# Patient Record
Sex: MALE | Race: WHITE | NOT HISPANIC OR LATINO | Employment: OTHER | URBAN - METROPOLITAN AREA
[De-identification: names, ages, dates, MRNs, and addresses within clinical notes are randomized per-mention and may not be internally consistent; named-entity substitution may affect disease eponyms.]

---

## 2018-06-16 ENCOUNTER — HOSPITAL ENCOUNTER (EMERGENCY)
Facility: HOSPITAL | Age: 63
Discharge: HOME | End: 2018-06-16
Attending: EMERGENCY MEDICINE

## 2018-06-16 VITALS
BODY MASS INDEX: 30.44 KG/M2 | RESPIRATION RATE: 16 BRPM | OXYGEN SATURATION: 97 % | TEMPERATURE: 98 F | WEIGHT: 250 LBS | HEART RATE: 83 BPM | HEIGHT: 76 IN | SYSTOLIC BLOOD PRESSURE: 127 MMHG | DIASTOLIC BLOOD PRESSURE: 77 MMHG

## 2018-06-16 DIAGNOSIS — R05.9 COUGH: ICD-10-CM

## 2018-06-16 DIAGNOSIS — R21 FACIAL RASH: Primary | ICD-10-CM

## 2018-06-16 PROCEDURE — 25000003 PHARM REV CODE 250: Performed by: PHYSICIAN ASSISTANT

## 2018-06-16 PROCEDURE — 63600175 PHARM REV CODE 636 W HCPCS: Performed by: PHYSICIAN ASSISTANT

## 2018-06-16 PROCEDURE — 99284 EMERGENCY DEPT VISIT MOD MDM: CPT

## 2018-06-16 RX ORDER — CETIRIZINE HYDROCHLORIDE 10 MG/1
10 TABLET ORAL DAILY
Qty: 30 TABLET | Refills: 0 | Status: SHIPPED | OUTPATIENT
Start: 2018-06-16 | End: 2019-06-16

## 2018-06-16 RX ORDER — ALBUTEROL SULFATE 90 UG/1
1-2 AEROSOL, METERED RESPIRATORY (INHALATION) EVERY 6 HOURS PRN
Qty: 1 INHALER | Refills: 0 | Status: SHIPPED | OUTPATIENT
Start: 2018-06-16 | End: 2019-06-16

## 2018-06-16 RX ORDER — VALACYCLOVIR HYDROCHLORIDE 1 G/1
1000 TABLET, FILM COATED ORAL 3 TIMES DAILY
Qty: 21 TABLET | Refills: 0 | Status: SHIPPED | OUTPATIENT
Start: 2018-06-16 | End: 2018-06-23

## 2018-06-16 RX ORDER — TETRACAINE HYDROCHLORIDE 5 MG/ML
2 SOLUTION OPHTHALMIC
Status: COMPLETED | OUTPATIENT
Start: 2018-06-16 | End: 2018-06-16

## 2018-06-16 RX ORDER — VALACYCLOVIR HYDROCHLORIDE 500 MG/1
1000 TABLET, FILM COATED ORAL ONCE
Status: COMPLETED | OUTPATIENT
Start: 2018-06-16 | End: 2018-06-16

## 2018-06-16 RX ORDER — FLUTICASONE PROPIONATE 50 MCG
1 SPRAY, SUSPENSION (ML) NASAL 2 TIMES DAILY PRN
Qty: 15 G | Refills: 0 | Status: SHIPPED | OUTPATIENT
Start: 2018-06-16

## 2018-06-16 RX ADMIN — VALACYCLOVIR HYDROCHLORIDE 1000 MG: 500 TABLET, FILM COATED ORAL at 10:06

## 2018-06-16 RX ADMIN — TETRACAINE HYDROCHLORIDE 2 DROP: 5 SOLUTION OPHTHALMIC at 10:06

## 2018-06-16 RX ADMIN — FLUORESCEIN SODIUM AND BENOXINATE HYDROCHLORIDE 1 DROP: 4; 2.5 SOLUTION OPHTHALMIC at 10:06

## 2018-06-16 NOTE — DISCHARGE INSTRUCTIONS
Continue current care. Albuterol inhaler as needed for shortness of breath or wheeze. Zyrtec daily. Flonase twice daily. Valtrex as prescribed. Follow-up with a primary care provider next week for reevaluation. Return to this ED if rash worsens, if fever persists despite treatment, if any other problems occur.

## 2018-06-16 NOTE — ED PROVIDER NOTES
Encounter Date: 6/16/2018     This is a 62 y.o. male complaining of painful rash to right forehead. Reports associated subjective high fevers for the past two days. Took ibuprofen at 03:00 this morning.    I have evaluated and conducted a medical screening exam with initial orders entered, if indicated, to expedite care. The patient will be placed in a treatment area when one is available. Care will be transferred to an alternate provider for a full assessment including but not limited to: history, physical exam, additional orders, and final disposition.    José Manuel Fernández NP         History   No chief complaint on file.    63yo M with no significant pmh on file presents to ED with chief complaint rash to L frontal scalp x 5 days. Pt states he recently began with productive cough, odynophagia, myalgias over the past 2 weeks. He states he began with fever on Monday, rash on Tuesday. Describes pain as sharp, sometimes burning/tingling sensation. Pain constant, keeps him up at night. No alleviating factors. No radiation of pain. No SOB or CP. NO hemoptysis. No night sweats or unintentional weight loss.           Review of patient's allergies indicates:   Allergen Reactions    Penicillins Anaphylaxis     No past medical history on file.  Past Surgical History:   Procedure Laterality Date    gall bladder remove  1991     No family history on file.  Social History   Substance Use Topics    Smoking status: Never Smoker    Smokeless tobacco: Never Used    Alcohol use Yes      Comment: occacionally     Review of Systems   Constitutional: Positive for chills and fever.   HENT: Negative for sore throat.    Respiratory: Positive for cough. Negative for shortness of breath.    Cardiovascular: Negative for chest pain.   Gastrointestinal: Negative for nausea.   Genitourinary: Negative for dysuria.   Musculoskeletal: Positive for myalgias. Negative for back pain, neck pain and neck stiffness.   Skin: Positive for rash.    Neurological: Negative for weakness.   Hematological: Does not bruise/bleed easily.   All other systems reviewed and are negative.      Physical Exam     Initial Vitals   BP Pulse Resp Temp SpO2   -- -- -- -- --      MAP       --         Physical Exam    Nursing note and vitals reviewed.  Constitutional: He appears well-developed and well-nourished. He is not diaphoretic. No distress.   HENT:   Head: Normocephalic and atraumatic.       Eyes: Conjunctivae and EOM are normal. Pupils are equal, round, and reactive to light.   No ganglion lesion to L eye on fluorescein exam, no lid droop, no proptosis, no periorbital erythema or swelling   Neck: Normal range of motion. Neck supple.   Cardiovascular: Intact distal pulses.   Pulmonary/Chest: Breath sounds normal. No respiratory distress. He has no wheezes. He has no rhonchi. He exhibits no tenderness.   Abdominal: Soft. Bowel sounds are normal. He exhibits no distension. There is no tenderness.   Musculoskeletal: Normal range of motion. He exhibits no tenderness.   Neurological: He is alert and oriented to person, place, and time. He has normal strength.   Skin: Skin is warm and dry. Capillary refill takes less than 2 seconds. No rash and no abscess noted. No erythema.   Psychiatric: He has a normal mood and affect. His behavior is normal. Judgment and thought content normal.         ED Course   Procedures  Labs Reviewed - No data to display       No orders to display        Medical Decision Making:   Differential Diagnosis:   Herpes zoster, cellulitis, dermatitis  ED Management:  62-year-old male chief complaint rash ×5 days, intermittent fever over the past 6 days, recent illness over the past 2 weeks.  Is not immunocompromised.  Overall well-appearing and nontoxic.  Rash does appear blistered. He is well appearing, nontoxic.  Vitals reassuring.  Lungs clear bilaterally.  Chest x-ray negative for consolidation, effusion, or pneumothorax.  He denies shortness of breath  or chest pain.  I do think patient has been dealing with an upper respiratory illness over the past 2-3 weeks, which made him susceptible to Varicella.  Given the proximity to eye, fluorescein exam performed which was negative for any ganglionic or stellate lesion.  I do think this represent herpes zoster, will treat accordingly.  The patient follow-up with primary care physician early next week for reevaluation.  Return precautions given.   Other:   I have discussed this case with another health care provider.       <> Summary of the Discussion: Dr. Garcia                      Clinical Impression:   The primary encounter diagnosis was Facial rash. A diagnosis of Cough was also pertinent to this visit.      Disposition:   Disposition: Discharged  Condition: Stable                        Carlo Bailey PA-C  06/16/18 [id number]

## 2018-06-16 NOTE — ED TRIAGE NOTES
Fever began Monday with chills. Painful tingling red rash erupted yesterday, but first began with pain Wednesday. Motrin last taken at 0300 this am. Unknown highest temp

## 2019-10-14 ENCOUNTER — ANESTHESIA (OUTPATIENT)
Dept: SURGERY | Facility: HOSPITAL | Age: 64
End: 2019-10-14

## 2019-10-14 ENCOUNTER — HOSPITAL ENCOUNTER (EMERGENCY)
Facility: HOSPITAL | Age: 64
Discharge: ANOTHER HOSPITAL | End: 2019-10-14
Attending: EMERGENCY MEDICINE

## 2019-10-14 ENCOUNTER — ANESTHESIA EVENT (OUTPATIENT)
Dept: SURGERY | Facility: HOSPITAL | Age: 64
End: 2019-10-14

## 2019-10-14 VITALS
OXYGEN SATURATION: 100 % | SYSTOLIC BLOOD PRESSURE: 151 MMHG | BODY MASS INDEX: 29.83 KG/M2 | WEIGHT: 245 LBS | TEMPERATURE: 100 F | RESPIRATION RATE: 26 BRPM | HEART RATE: 98 BPM | HEIGHT: 76 IN | DIASTOLIC BLOOD PRESSURE: 72 MMHG

## 2019-10-14 DIAGNOSIS — R05.9 COUGH: ICD-10-CM

## 2019-10-14 DIAGNOSIS — Z46.59 ENCOUNTER FOR NASOGASTRIC (NG) TUBE PLACEMENT: ICD-10-CM

## 2019-10-14 DIAGNOSIS — K12.2 LUDWIG'S ANGINA: Primary | ICD-10-CM

## 2019-10-14 LAB
ALBUMIN SERPL BCP-MCNC: 3.9 G/DL (ref 3.5–5.2)
ALP SERPL-CCNC: 104 U/L (ref 55–135)
ALT SERPL W/O P-5'-P-CCNC: 92 U/L (ref 10–44)
ANION GAP SERPL CALC-SCNC: 12 MMOL/L (ref 8–16)
ANION GAP SERPL CALC-SCNC: 17 MMOL/L (ref 8–16)
AST SERPL-CCNC: 61 U/L (ref 10–40)
BASOPHILS # BLD AUTO: 0.06 K/UL (ref 0–0.2)
BASOPHILS NFR BLD: 0.4 % (ref 0–1.9)
BILIRUB SERPL-MCNC: 2.4 MG/DL (ref 0.1–1)
BUN SERPL-MCNC: 22 MG/DL (ref 8–23)
BUN SERPL-MCNC: 24 MG/DL (ref 6–30)
CALCIUM SERPL-MCNC: 9.8 MG/DL (ref 8.7–10.5)
CHLORIDE SERPL-SCNC: 100 MMOL/L (ref 95–110)
CHLORIDE SERPL-SCNC: 98 MMOL/L (ref 95–110)
CO2 SERPL-SCNC: 28 MMOL/L (ref 23–29)
CREAT SERPL-MCNC: 1.5 MG/DL (ref 0.5–1.4)
CREAT SERPL-MCNC: 1.5 MG/DL (ref 0.5–1.4)
CRP SERPL-MCNC: 247.8 MG/L (ref 0–8.2)
DIFFERENTIAL METHOD: ABNORMAL
EOSINOPHIL # BLD AUTO: 0.2 K/UL (ref 0–0.5)
EOSINOPHIL NFR BLD: 1 % (ref 0–8)
ERYTHROCYTE [DISTWIDTH] IN BLOOD BY AUTOMATED COUNT: 13.2 % (ref 11.5–14.5)
ERYTHROCYTE [SEDIMENTATION RATE] IN BLOOD BY WESTERGREN METHOD: 24 MM/HR (ref 0–10)
EST. GFR  (AFRICAN AMERICAN): 56 ML/MIN/1.73 M^2
EST. GFR  (NON AFRICAN AMERICAN): 49 ML/MIN/1.73 M^2
GLUCOSE SERPL-MCNC: 186 MG/DL (ref 70–110)
GLUCOSE SERPL-MCNC: 196 MG/DL (ref 70–110)
HCT VFR BLD AUTO: 49.5 % (ref 40–54)
HCT VFR BLD CALC: 49 %PCV (ref 36–54)
HGB BLD-MCNC: 16.3 G/DL (ref 14–18)
IMM GRANULOCYTES # BLD AUTO: 0.08 K/UL (ref 0–0.04)
IMM GRANULOCYTES NFR BLD AUTO: 0.6 % (ref 0–0.5)
LACTATE SERPL-SCNC: 2.9 MMOL/L (ref 0.5–2.2)
LYMPHOCYTES # BLD AUTO: 0.5 K/UL (ref 1–4.8)
LYMPHOCYTES NFR BLD: 3.6 % (ref 18–48)
MCH RBC QN AUTO: 31.8 PG (ref 27–31)
MCHC RBC AUTO-ENTMCNC: 32.9 G/DL (ref 32–36)
MCV RBC AUTO: 97 FL (ref 82–98)
MONOCYTES # BLD AUTO: 0.9 K/UL (ref 0.3–1)
MONOCYTES NFR BLD: 6.1 % (ref 4–15)
NEUTROPHILS # BLD AUTO: 12.8 K/UL (ref 1.8–7.7)
NEUTROPHILS NFR BLD: 88.3 % (ref 38–73)
NRBC BLD-RTO: 0 /100 WBC
PLATELET # BLD AUTO: 199 K/UL (ref 150–350)
PMV BLD AUTO: 10.5 FL (ref 9.2–12.9)
POC IONIZED CALCIUM: 1.18 MMOL/L (ref 1.06–1.42)
POC TCO2 (MEASURED): 28 MMOL/L (ref 23–29)
POTASSIUM BLD-SCNC: 4.5 MMOL/L (ref 3.5–5.1)
POTASSIUM SERPL-SCNC: 4.8 MMOL/L (ref 3.5–5.1)
PROT SERPL-MCNC: 7.8 G/DL (ref 6–8.4)
RBC # BLD AUTO: 5.13 M/UL (ref 4.6–6.2)
SAMPLE: ABNORMAL
SODIUM BLD-SCNC: 136 MMOL/L (ref 136–145)
SODIUM SERPL-SCNC: 140 MMOL/L (ref 136–145)
WBC # BLD AUTO: 14.48 K/UL (ref 3.9–12.7)

## 2019-10-14 PROCEDURE — 25500020 PHARM REV CODE 255: Performed by: EMERGENCY MEDICINE

## 2019-10-14 PROCEDURE — 63600175 PHARM REV CODE 636 W HCPCS: Performed by: NURSE PRACTITIONER

## 2019-10-14 PROCEDURE — 99900026 HC AIRWAY MAINTENANCE (STAT)

## 2019-10-14 PROCEDURE — 84132 ASSAY OF SERUM POTASSIUM: CPT

## 2019-10-14 PROCEDURE — 85652 RBC SED RATE AUTOMATED: CPT

## 2019-10-14 PROCEDURE — 37000008 HC ANESTHESIA 1ST 15 MINUTES: Performed by: SURGERY

## 2019-10-14 PROCEDURE — 99291 CRITICAL CARE FIRST HOUR: CPT | Mod: 25

## 2019-10-14 PROCEDURE — 80053 COMPREHEN METABOLIC PANEL: CPT

## 2019-10-14 PROCEDURE — 36000705 HC OR TIME LEV I EA ADD 15 MIN: Performed by: SURGERY

## 2019-10-14 PROCEDURE — S0077 INJECTION, CLINDAMYCIN PHOSP: HCPCS | Performed by: NURSE PRACTITIONER

## 2019-10-14 PROCEDURE — 25000003 PHARM REV CODE 250: Performed by: NURSE PRACTITIONER

## 2019-10-14 PROCEDURE — 96366 THER/PROPH/DIAG IV INF ADDON: CPT

## 2019-10-14 PROCEDURE — 96367 TX/PROPH/DG ADDL SEQ IV INF: CPT

## 2019-10-14 PROCEDURE — 96365 THER/PROPH/DIAG IV INF INIT: CPT

## 2019-10-14 PROCEDURE — 27000221 HC OXYGEN, UP TO 24 HOURS

## 2019-10-14 PROCEDURE — 94002 VENT MGMT INPAT INIT DAY: CPT

## 2019-10-14 PROCEDURE — 25000003 PHARM REV CODE 250: Performed by: ANESTHESIOLOGY

## 2019-10-14 PROCEDURE — 96375 TX/PRO/DX INJ NEW DRUG ADDON: CPT | Mod: 59

## 2019-10-14 PROCEDURE — 36000704 HC OR TIME LEV I 1ST 15 MIN: Performed by: SURGERY

## 2019-10-14 PROCEDURE — 85014 HEMATOCRIT: CPT

## 2019-10-14 PROCEDURE — 37000009 HC ANESTHESIA EA ADD 15 MINS: Performed by: SURGERY

## 2019-10-14 PROCEDURE — 63600175 PHARM REV CODE 636 W HCPCS: Performed by: ANESTHESIOLOGY

## 2019-10-14 PROCEDURE — 63600175 PHARM REV CODE 636 W HCPCS: Performed by: EMERGENCY MEDICINE

## 2019-10-14 PROCEDURE — 99900035 HC TECH TIME PER 15 MIN (STAT)

## 2019-10-14 PROCEDURE — 84295 ASSAY OF SERUM SODIUM: CPT

## 2019-10-14 PROCEDURE — 94761 N-INVAS EAR/PLS OXIMETRY MLT: CPT

## 2019-10-14 PROCEDURE — 83605 ASSAY OF LACTIC ACID: CPT

## 2019-10-14 PROCEDURE — 82565 ASSAY OF CREATININE: CPT

## 2019-10-14 PROCEDURE — 85025 COMPLETE CBC W/AUTO DIFF WBC: CPT

## 2019-10-14 PROCEDURE — 99292 CRITICAL CARE ADDL 30 MIN: CPT

## 2019-10-14 PROCEDURE — 86140 C-REACTIVE PROTEIN: CPT

## 2019-10-14 PROCEDURE — 82330 ASSAY OF CALCIUM: CPT

## 2019-10-14 PROCEDURE — 25000003 PHARM REV CODE 250: Performed by: EMERGENCY MEDICINE

## 2019-10-14 PROCEDURE — 63600175 PHARM REV CODE 636 W HCPCS

## 2019-10-14 PROCEDURE — 87040 BLOOD CULTURE FOR BACTERIA: CPT | Mod: 59

## 2019-10-14 RX ORDER — CLINDAMYCIN PHOSPHATE 600 MG/50ML
600 INJECTION, SOLUTION INTRAVENOUS
Status: COMPLETED | OUTPATIENT
Start: 2019-10-14 | End: 2019-10-14

## 2019-10-14 RX ORDER — MIDAZOLAM HYDROCHLORIDE 1 MG/ML
4 INJECTION INTRAMUSCULAR; INTRAVENOUS
Status: COMPLETED | OUTPATIENT
Start: 2019-10-14 | End: 2019-10-14

## 2019-10-14 RX ORDER — LABETALOL HYDROCHLORIDE 5 MG/ML
INJECTION, SOLUTION INTRAVENOUS
Status: DISCONTINUED | OUTPATIENT
Start: 2019-10-14 | End: 2019-10-15

## 2019-10-14 RX ORDER — FENTANYL CITRATE 50 UG/ML
INJECTION, SOLUTION INTRAMUSCULAR; INTRAVENOUS
Status: DISCONTINUED | OUTPATIENT
Start: 2019-10-14 | End: 2019-10-15

## 2019-10-14 RX ORDER — ROCURONIUM BROMIDE 10 MG/ML
INJECTION, SOLUTION INTRAVENOUS
Status: DISCONTINUED | OUTPATIENT
Start: 2019-10-14 | End: 2019-10-15

## 2019-10-14 RX ORDER — PROPOFOL 10 MG/ML
INJECTION, EMULSION INTRAVENOUS
Status: COMPLETED
Start: 2019-10-14 | End: 2019-10-14

## 2019-10-14 RX ORDER — VECURONIUM BROMIDE FOR INJECTION 1 MG/ML
10 INJECTION, POWDER, LYOPHILIZED, FOR SOLUTION INTRAVENOUS ONCE
Status: COMPLETED | OUTPATIENT
Start: 2019-10-14 | End: 2019-10-14

## 2019-10-14 RX ORDER — LEVOFLOXACIN 5 MG/ML
750 INJECTION, SOLUTION INTRAVENOUS
Status: COMPLETED | OUTPATIENT
Start: 2019-10-14 | End: 2019-10-14

## 2019-10-14 RX ORDER — PROPOFOL 10 MG/ML
VIAL (ML) INTRAVENOUS
Status: DISCONTINUED | OUTPATIENT
Start: 2019-10-14 | End: 2019-10-15

## 2019-10-14 RX ORDER — PROPOFOL 10 MG/ML
20 INJECTION, EMULSION INTRAVENOUS
Status: COMPLETED | OUTPATIENT
Start: 2019-10-14 | End: 2019-10-14

## 2019-10-14 RX ORDER — KETAMINE HYDROCHLORIDE 50 MG/ML
INJECTION, SOLUTION INTRAMUSCULAR; INTRAVENOUS
Status: DISCONTINUED | OUTPATIENT
Start: 2019-10-14 | End: 2019-10-15

## 2019-10-14 RX ORDER — PROPOFOL 10 MG/ML
50 INJECTION, EMULSION INTRAVENOUS
Status: COMPLETED | OUTPATIENT
Start: 2019-10-14 | End: 2019-10-14

## 2019-10-14 RX ORDER — MIDAZOLAM HYDROCHLORIDE 1 MG/ML
INJECTION, SOLUTION INTRAMUSCULAR; INTRAVENOUS
Status: DISCONTINUED | OUTPATIENT
Start: 2019-10-14 | End: 2019-10-15

## 2019-10-14 RX ADMIN — PROPOFOL 50 MCG/KG/MIN: 10 INJECTION, EMULSION INTRAVENOUS at 04:10

## 2019-10-14 RX ADMIN — VECURONIUM BROMIDE 10 MG: 1 INJECTION, POWDER, LYOPHILIZED, FOR SOLUTION INTRAVENOUS at 06:10

## 2019-10-14 RX ADMIN — LEVOFLOXACIN 750 MG: 750 INJECTION, SOLUTION INTRAVENOUS at 01:10

## 2019-10-14 RX ADMIN — MIDAZOLAM HYDROCHLORIDE 1 MG: 1 INJECTION, SOLUTION INTRAMUSCULAR; INTRAVENOUS at 03:10

## 2019-10-14 RX ADMIN — MIDAZOLAM HYDROCHLORIDE 2 MG: 1 INJECTION, SOLUTION INTRAMUSCULAR; INTRAVENOUS at 03:10

## 2019-10-14 RX ADMIN — BENZOCAINE, BUTAMBEN, AND TETRACAINE HYDROCHLORIDE 1 SPRAY: .028; .004; .004 AEROSOL, SPRAY TOPICAL at 03:10

## 2019-10-14 RX ADMIN — FENTANYL CITRATE 100 MCG: 50 INJECTION INTRAMUSCULAR; INTRAVENOUS at 03:10

## 2019-10-14 RX ADMIN — PROPOFOL 30 MG: 10 INJECTION, EMULSION INTRAVENOUS at 03:10

## 2019-10-14 RX ADMIN — SODIUM CHLORIDE 1000 ML: 0.9 INJECTION, SOLUTION INTRAVENOUS at 12:10

## 2019-10-14 RX ADMIN — KETAMINE HYDROCHLORIDE 10 MG: 50 INJECTION, SOLUTION, CONCENTRATE INTRAMUSCULAR; INTRAVENOUS at 03:10

## 2019-10-14 RX ADMIN — MIDAZOLAM HYDROCHLORIDE 4 MG: 1 INJECTION, SOLUTION INTRAMUSCULAR; INTRAVENOUS at 05:10

## 2019-10-14 RX ADMIN — PROPOFOL 20 MG: 10 INJECTION, EMULSION INTRAVENOUS at 03:10

## 2019-10-14 RX ADMIN — CLINDAMYCIN IN 5 PERCENT DEXTROSE 600 MG: 12 INJECTION, SOLUTION INTRAVENOUS at 12:10

## 2019-10-14 RX ADMIN — LABETALOL HYDROCHLORIDE 10 MG: 5 INJECTION, SOLUTION INTRAVENOUS at 03:10

## 2019-10-14 RX ADMIN — KETAMINE HYDROCHLORIDE 30 MG: 50 INJECTION, SOLUTION, CONCENTRATE INTRAMUSCULAR; INTRAVENOUS at 03:10

## 2019-10-14 RX ADMIN — IOHEXOL 100 ML: 350 INJECTION, SOLUTION INTRAVENOUS at 01:10

## 2019-10-14 RX ADMIN — KETAMINE HYDROCHLORIDE 20 MG: 50 INJECTION, SOLUTION, CONCENTRATE INTRAMUSCULAR; INTRAVENOUS at 03:10

## 2019-10-14 RX ADMIN — ROCURONIUM BROMIDE 50 MG: 10 INJECTION, SOLUTION INTRAVENOUS at 03:10

## 2019-10-14 RX ADMIN — PROPOFOL 70 MCG/KG/MIN: 10 INJECTION, EMULSION INTRAVENOUS at 05:10

## 2019-10-14 RX ADMIN — KETAMINE HYDROCHLORIDE 40 MG: 50 INJECTION, SOLUTION, CONCENTRATE INTRAMUSCULAR; INTRAVENOUS at 03:10

## 2019-10-14 NOTE — ED NOTES
Per Dr. Adams keep propofol parameter orders the same as previous order. Titrate every 5mins by 10mcg/kg/min for a max of 200mcg/kg/min.

## 2019-10-14 NOTE — CARE UPDATE
Pt left by EMS to [facility]. Pt remained intubated and was placed on EMS transport ventilator without any problems.

## 2019-10-14 NOTE — ED PROVIDER NOTES
Encounter Date: 10/14/2019    SCRIBE #1 NOTE: I, Jasmin Banks, am scribing for, and in the presence of,  Maryann Walton NP. I have scribed the following portions of the note - Other sections scribed: HPI, ROS, PE.       History     Chief Complaint   Patient presents with    Sore Throat     pt c/o sore throat x 3 days.     This is a 64 y.o. Male with a who presents to the ED complaining of acute, severe, dental issue since 2 days ago. He reports being recommended by urgent care this morning to go to the ER for IV antibiotics. Patient reports associated subjective fever, appetite change, and congestion.  He reports that the pain initially started in the right lower back dental area but now the pain is in his mouth and throat. He adds difficulty swallowing however he is tolerating his secretions at this time. He states no other associated symptoms. He adds nothing worsens symptoms. He notes nothing alleviates symptoms.         The history is provided by the patient. No  was used.     Review of patient's allergies indicates:   Allergen Reactions    Penicillins Anaphylaxis     History reviewed. No pertinent past medical history.  Past Surgical History:   Procedure Laterality Date    CHOLECYSTECTOMY      gall bladder remove  1991     History reviewed. No pertinent family history.  Social History     Tobacco Use    Smoking status: Never Smoker    Smokeless tobacco: Never Used   Substance Use Topics    Alcohol use: Yes     Comment: occacionally    Drug use: No     Review of Systems   Constitutional: Positive for appetite change and fever (Subjective). Negative for chills.   HENT: Positive for congestion and dental problem. Negative for sore throat.    Eyes: Negative for visual disturbance.   Respiratory: Negative for cough and shortness of breath.    Cardiovascular: Negative for chest pain.   Gastrointestinal: Negative for abdominal pain, nausea and vomiting.   Endocrine: Negative for  polyuria.   Musculoskeletal: Negative for back pain and neck pain.   Skin: Negative for rash.   Neurological: Negative for dizziness and headaches.   All other systems reviewed and are negative.      Physical Exam     Initial Vitals [10/14/19 1037]   BP Pulse Resp Temp SpO2   121/69 99 18 99.2 °F (37.3 °C) 96 %      MAP       --         Physical Exam    Nursing note reviewed.  Constitutional: He appears well-developed and well-nourished. He appears distressed.   Appears uncomfortable.  Mild distress.     HENT:   Head: Atraumatic.   Mouth/Throat: There is trismus in the jaw.   Floor of mouth firm.  Positive trismus.  Moderate edema to floor mouth with positive firmness and tenderness.   Eyes: Conjunctivae and EOM are normal. Pupils are equal, round, and reactive to light.   Neck: Normal range of motion. Edema (Moderate) present.   Moderate tenderness throughout anterior neck.    Mild stridor   Cardiovascular: Normal rate, regular rhythm, normal heart sounds and intact distal pulses.   Pulmonary/Chest: Breath sounds normal. Stridor present. No respiratory distress. He has no wheezes. He has no rhonchi. He has no rales.   Abdominal: Soft. Bowel sounds are normal. He exhibits no distension. There is no tenderness.   Musculoskeletal: Normal range of motion. He exhibits no edema.   Neurological: He is alert and oriented to person, place, and time. He has normal strength. No cranial nerve deficit. GCS score is 15. GCS eye subscore is 4. GCS verbal subscore is 5. GCS motor subscore is 6.   Skin: Skin is warm and dry. Capillary refill takes less than 2 seconds. No rash noted.         ED Course   Procedures  Labs Reviewed   CBC W/ AUTO DIFFERENTIAL - Abnormal; Notable for the following components:       Result Value    WBC 14.48 (*)     Mean Corpuscular Hemoglobin 31.8 (*)     Immature Granulocytes 0.6 (*)     Gran # (ANC) 12.8 (*)     Immature Grans (Abs) 0.08 (*)     Lymph # 0.5 (*)     Gran% 88.3 (*)     Lymph% 3.6 (*)      All other components within normal limits   COMPREHENSIVE METABOLIC PANEL - Abnormal; Notable for the following components:    Glucose 186 (*)     Creatinine 1.5 (*)     Total Bilirubin 2.4 (*)     AST 61 (*)     ALT 92 (*)     eGFR if  56 (*)     eGFR if non  49 (*)     All other components within normal limits   C-REACTIVE PROTEIN - Abnormal; Notable for the following components:    .8 (*)     All other components within normal limits   SEDIMENTATION RATE - Abnormal; Notable for the following components:    Sed Rate 24 (*)     All other components within normal limits   LACTIC ACID, PLASMA - Abnormal; Notable for the following components:    Lactate (Lactic Acid) 2.9 (*)     All other components within normal limits   ISTAT PROCEDURE - Abnormal; Notable for the following components:    POC Glucose 196 (*)     POC Creatinine 1.5 (*)     POC Anion Gap 17 (*)     All other components within normal limits   CULTURE, BLOOD   CULTURE, BLOOD   ISTAT CHEM8          Imaging Results           CT Neck Chest With Contrast (XPD) (Final result)  Result time 10/14/19 14:38:47    Final result by Hima Wallace MD (10/14/19 14:38:47)                 Impression:      1. Findings suggestive of an abscess that measures at least approximately 4.5 x 1.9 cm deep to the right mylohyoid muscle, in the expected location of the sublingual glands.  In the differential diagnosis an abscess in the region of the sublingual gland or Laureano's angina may be considered.  There is a cavity seen in the right 2nd molar tooth however no definite erosive change of the bony walls of the right mandible noted.  2. Enlarged right submandibular gland when compared to the contralateral side likely related to right submandibular sialoadenitis.  3. Cervical nodes likely inflammatory nodes.  4. Minimal scar-like changes are seen in the right lung base.  Chest CT otherwise is grossly unremarkable.  This report was flagged in  Epic as abnormal.      Electronically signed by: Hima Wallace MD  Date:    10/14/2019  Time:    14:38             Narrative:    EXAMINATION:  CT NECK CHEST WITH CONTRAST (XPD)    CLINICAL HISTORY:  abscess;    TECHNIQUE:  Low dose axial images, coronal and sagittal reformations were obtained from the skull base to the lung bases following the intravenous administration of 75 mL of Omnipaque 350.    COMPARISON:  None.    FINDINGS:  Deep to the mylohyoid muscle and medial to the body of the right mandible there is a large abscess with multiple pockets of air like density in the floor of the tongue.  The bony walls of the body of the mandible however are intact.  There is a carious tooth with a cavity involving the 2nd molar tooth.  However I do not clearly visualize any definite abscess in the region of the root of the carious tooth.  Rest of the teeth appear to be normal.  The abscess appears to be in the region of the sublingual glands.  There is infiltration of the fat.    Slight enlargement of the right submandibular gland.  I cannot exclude solid nidus involving the right submandibular gland.  Along the expected course of the Gay's duct no abnormal calcifications noted.    Slight thickening of the platysma overlying the right neck.  In addition there are also multiple submental as well as submandibular enlarged nodes measuring approximately 1-1.5 cm in maximum diameter and likely representing inflammatory/infectious nodes.  The abscess appears to be contained deep to the mylohyoid muscle.    The nasopharynx and the parapharyngeal soft tissues and the parotid glands and the  muscles are unremarkable.  The paranasal air sinuses are clear.  Skull base is unremarkable.    No supraglottic masses are noted.  The vocal cords are symmetric and normal.  The visualized thyroid is unremarkable.  Prevertebral soft tissues are within normal limits.  There is spondylotic changes in the cervical spine.    CT scan  of the chest:    There is no significant axillary, supraclavicular or mediastinal adenopathy.  The vascular structures in the mediastinum are unremarkable without aneurysmal dilatation of the ascending aorta or the descending aorta.  The origins of the great vessels of the aortic arch are within normal limits.  There is no hilar adenopathy.    The visualized trachea is unremarkable.    Within the lung parenchyma no focal consolidations are noted.  There is no pulmonary nodules noted.  Platelike atelectasis or scar-like changes are noted in the right lung base.  There is no pleural effusion.    In the left lung no significant pulmonary nodules or consolidations or atelectasis or pleural effusions.    Heart size is within normal limits.    Spleen measures approximately 16 cm representing splenomegaly.  Within the liver no gross hepatic masses are noted.  There are postoperative changes from prior cholecystectomy.  No intrahepatic biliary ductal dilatation.  The visualized adrenal glands are unremarkable.                               X-Ray Chest PA And Lateral (Final result)  Result time 10/14/19 12:02:41    Final result by Hima Wallace MD (10/14/19 12:02:41)                 Impression:      1. Bandlike atelectasis or scars in the right lung base.  Rest of the examination is unremarkable.      Electronically signed by: Hima Wallace MD  Date:    10/14/2019  Time:    12:02             Narrative:    EXAMINATION:  XR CHEST PA AND LATERAL    CLINICAL HISTORY:  Cough    TECHNIQUE:  PA and lateral views of the chest were performed.    COMPARISON:  Chest 06/16/2018    FINDINGS:  There is again nonspecific elevation of the right hemidiaphragm.  Heart size is within normal limits.  Mediastinum is unremarkable.  There is no tracheal abnormality.  There is bandlike atelectasis or scar-like changes in the right lung base.  No lobar consolidations or pneumothorax or pleural effusion.  There are no cavitary lung masses.    The  "visualized ribs demonstrate no significant abnormalities.                                 Medical Decision Making:   Initial Assessment:   Pt with laureano's, stemming from dental infection, protecting airway  Differential Diagnosis:   Laureano's angina, abscess into throat, sore throat  ED Management:  Diagnosis management comments: This is an urgent evaluation of a 64-year-old male  that presented to the ER with c/o pain to mouth and throat x2 days. Pts exam was as above.     Based on the fact patient has been febrile and the floor over small this firm to palpation, I believe this is most likely Laureano's angina.  IV clindamycin and Levaquin given  1 L of normal saline    Labs  were reviewed and discussed with pt.   CBC:  WBC: 14.48, H/H: 16/49/ plt: 199  CHEM: Na: 140, K: 4.8, cl:100, co2: 28, bun/creatine: 22/1.5, glu: 186  CRP: 247.8  AST/ALT: 61/92        1349:  Dicussed with [facility] transfer center  1400:  Dr. Adams discussed with Ct need for stat read  1410:  Dr. Adams discussed with CT need for stat read.  Awaiting read on CT  1415:  Transfer center states ENT has not called back   1418:  Discussed with Radiologist who will read "shortly"    ENT Sesay requested intubation before transfer.  He states that the appearance of CT is concerning for airway loss.  1438:  Discussed with Dr. Dai, [facility], who is accepting pt    Anesthesia to come evaluate patient for intubation // Pt to OR for intubation.             Scribe Attestation:   Scribe #1: I performed the above scribed service and the documentation accurately describes the services I performed. I attest to the accuracy of the note.    Attending Attestation:   Physician Attestation Statement for Resident:  As the supervising MD   Physician Attestation Statement: I have personally seen and examined this patient.   I agree with the above history. -:   As the supervising MD I agree with the above PE.   -: Clinically pt has parriss   As the supervising MD I " agree with the above treatment, course, plan, and disposition.   -: I have called rads to try and expedite the read. Clinically the pt has laureano's, and I find his CT concerning given presence of gas in area of swelling. We have paged ENT for consult, We have also called the transfer center.    We have consulted anesthiology, they will assist with airway.    Anesthesia has obtained airway. We will transfer to [facility].        Attending Critical Care:   Critical Care Times:   Direct Patient Care (initial evaluation, reassessments, and time considering the case)................................................................90 minutes.   Additional History from reviewing old medical records or taking additional history from the family, EMS, PCP, etc.......................10 minutes.   Ordering, Reviewing, and Interpreting Diagnostic Studies...............................................................................................................10 minutes.   Documentation..................................................................................................................................................................................10 minutes.   Consultation with other Physicians. .................................................................................................................................................10 minutes.   ==============================================================  · Total Critical Care Time - exclusive of procedural time: 130 minutes.  ==============================================================              ED Course as of Oct 14 1619   Mon Oct 14, 2019   1330 CT Neck Chest With Contrast (XPD) [CS]      ED Course User Index  [CS] Maryann Walton NP     Clinical Impression:       ICD-10-CM ICD-9-CM   1. Laureano's angina K12.2 528.3   2. Cough R05 786.2         Disposition:   Disposition: Transferred  Condition: Serious       Scribe attestation: Maryann FOX  NP-C  , personally performed the services described in this documentation. All medical record entries made by the scribe were at my direction and in my presence.  I have reviewed the chart and agree that the record reflects my personal performance and is accurate and complete.                  Maryann Walton NP  10/14/19 1622       Bal Adams MD  10/14/19 [id number]

## 2019-10-15 NOTE — ANESTHESIA POSTPROCEDURE EVALUATION
Anesthesia Post Evaluation    Patient: Dave Randhawa    Procedure(s) Performed: Procedure(s) (LRB):  EXAM UNDER ANESTHESIA, Emergency Intubation (N/A)    Final Anesthesia Type: general  Patient location during evaluation: ED  Patient participation: No - Unable to Participate, Intubation  Level of consciousness: sedated  Post-procedure vital signs: reviewed and stable  Pain management: adequate  Airway patency: patent  PONV status at discharge: No PONV  Anesthetic complications: no      Cardiovascular status: hemodynamically stable and hypertensive  Respiratory status: ventilator and intubated  Hydration status: euvolemic  Follow-up not needed.  Comments: Pt was intubated without complication on first attempt. Spontaneous respiration maintained. I told the ED doctor that   The pt had been given 50 mg rocuronium and that he would require a propofol drip or other continuous sedation. I also   Told him we had given ketamine and that he had started to get hypertensive in the ED when we dropped him off. He agreed to give sedation and treat bp          Vitals Value Taken Time   /62 10/14/2019  6:05 PM   Temp 37.5 °C (99.5 °F) 10/14/2019 12:19 PM   Pulse 99 10/14/2019  6:07 PM   Resp 29 10/14/2019  6:07 PM   SpO2 100 % 10/14/2019  6:06 PM   Vitals shown include unvalidated device data.      No case tracking events are documented in the log.      Pain/Yessenia Score: No data recorded

## 2019-10-15 NOTE — ANESTHESIA PREPROCEDURE EVALUATION
10/15/2019  Dave Randhawa is a 64 y.o., male.    Anesthesia Evaluation     I have reviewed the Nursing Notes.      Review of Systems  Social:  Non-Smoker   EENT/Dental:   Large swelling from abscess in right posterior orophaynx/glottis  Mouth opening = 1 finger in the ED   Cardiovascular:   Denies Pacemaker.  Denies MI.  Denies CAD.    Denies CABG/stent.   Denies Angina.    Pulmonary:  Pulmonary Normal    Neurological:   Denies CVA. Denies Seizures.        Physical Exam  General:  Obesity    Airway/Jaw/Neck:   Not able to assess mallampati   Mouth opening was 1 finger width in ED though per report had been 2 fingers earlier  Denies loose dentition in front  Neck range of motion was good but significant swelling to right neck and mandible area which was tender to palpation     Chest/Lungs:  Chest/Lungs Clear    Heart/Vascular:  Heart Findings: Normal       Mental Status:  Mental Status Findings: Normal        Anesthesia Plan  Type of Anesthesia, risks & benefits discussed:  Anesthesia Type:  general, MAC  Patient's Preference:   Intra-op Monitoring Plan: standard ASA monitors  Intra-op Monitoring Plan Comments:   Post Op Pain Control Plan:   Post Op Pain Control Plan Comments:   Induction:   IV  Beta Blocker:  Patient is not currently on a Beta-Blocker (No further documentation required).       Informed Consent:    ASA Score: 4     Day of Surgery Review of History & Physical:  There are no significant changes.  H&P update referred to the provider.     Anesthesia Plan Notes: Called to ED to assist with intubation of Laureano's angina pt prior to transport. Report was that pt had 2 finger mouth opening   And had originally been 100% but now was 94%. I did not obtain written consent on pt as I was continuously preparing for   Potentially emergent and complicated intubation, including possible need for surgical  airway. This all took time as I judged   Pt to be stable but I was concerned enough about his condition that I did not stop to obtain written consent. After assessing the situation  I decided it would be safest to do it with Surgery ready to do a trach if needed. I asked Dr. Segura if he preferred to do this   In the OR or in the ED and he elected for the OR. We transported the pt up to the OR with all of the airway equipment we  Had brought down to the ED and set up.         Ready For Surgery From Anesthesia Perspective.

## 2019-10-19 LAB
BACTERIA BLD CULT: NORMAL
BACTERIA BLD CULT: NORMAL